# Patient Record
Sex: FEMALE | Race: WHITE | NOT HISPANIC OR LATINO | URBAN - METROPOLITAN AREA
[De-identification: names, ages, dates, MRNs, and addresses within clinical notes are randomized per-mention and may not be internally consistent; named-entity substitution may affect disease eponyms.]

---

## 2017-05-23 ENCOUNTER — APPOINTMENT (RX ONLY)
Dept: URBAN - METROPOLITAN AREA CLINIC 71 | Facility: CLINIC | Age: 37
Setting detail: DERMATOLOGY
End: 2017-05-23

## 2017-05-23 ENCOUNTER — APPOINTMENT (RX ONLY)
Dept: URBAN - METROPOLITAN AREA CLINIC 70 | Facility: CLINIC | Age: 37
Setting detail: DERMATOLOGY
End: 2017-05-23

## 2017-05-23 DIAGNOSIS — L72.0 EPIDERMAL CYST: ICD-10-CM

## 2017-05-23 PROCEDURE — ? TREATMENT REGIMEN

## 2017-05-23 PROCEDURE — ? COUNSELING

## 2017-05-23 ASSESSMENT — LOCATION ZONE DERM
LOCATION ZONE: FACE
LOCATION ZONE: FACE

## 2017-05-23 ASSESSMENT — LOCATION SIMPLE DESCRIPTION DERM
LOCATION SIMPLE: GLABELLA
LOCATION SIMPLE: GLABELLA

## 2017-05-23 ASSESSMENT — LOCATION DETAILED DESCRIPTION DERM
LOCATION DETAILED: GLABELLA
LOCATION DETAILED: GLABELLA

## 2017-05-23 NOTE — HPI: CYST
How Severe Is Your Cyst?: severe
Is This A New Presentation, Or A Follow-Up?: Cysts
Additional History: Pt presents for treatment and removal options. She has not tried therapy at this time. She has a history of developing cystic lesion behind ears as well. LS

## 2017-05-23 NOTE — PROCEDURE: TREATMENT REGIMEN
Initiate Treatment: solodyn 55mg daily (weight based) x 14 days (provided samples due to no prescription coverage)
Otc Regimen: BPO wash daily
Detail Level: Simple
Plan: Size 1.5x1.7 Cm. Discussed treatment options. Recommend antibiotic at this time. Will likely plan for ILK at follow up. After office visit - case reviewed with Dr. Mora for excision consultation. Recommendation at this time is to use therapies to decrease size of lesion and will plan for possible excision if size improves. May need referral to oculoplastics. LS

## 2017-05-23 NOTE — PROCEDURE: TREATMENT REGIMEN
Otc Regimen: BPO wash daily
Plan: Size 1.5x1.7 Cm. Discussed treatment options. Recommend antibiotic at this time. Will likely plan for ILK at follow up. After office visit - case reviewed with Dr. Leslie for excision consultation. Recommendation at this time is to use therapies to decrease size of lesion and will plan for possible excision if size improves. May need referral to oculoplastics. LS
Detail Level: Simple
Initiate Treatment: solodyn 55mg daily (weight based) x 14 days (provided samples due to no prescription coverage)

## 2017-06-16 ENCOUNTER — APPOINTMENT (RX ONLY)
Dept: URBAN - METROPOLITAN AREA CLINIC 71 | Facility: CLINIC | Age: 37
Setting detail: DERMATOLOGY
End: 2017-06-16

## 2017-06-16 ENCOUNTER — APPOINTMENT (RX ONLY)
Dept: URBAN - METROPOLITAN AREA CLINIC 70 | Facility: CLINIC | Age: 37
Setting detail: DERMATOLOGY
End: 2017-06-16

## 2017-06-16 DIAGNOSIS — L72.0 EPIDERMAL CYST: ICD-10-CM | Status: UNCHANGED

## 2017-06-16 PROCEDURE — ? INTRALESIONAL KENALOG

## 2017-06-16 PROCEDURE — ? TREATMENT REGIMEN

## 2017-06-16 PROCEDURE — ? COUNSELING

## 2017-06-16 PROCEDURE — 11900 INJECT SKIN LESIONS </W 7: CPT

## 2017-06-16 ASSESSMENT — LOCATION ZONE DERM
LOCATION ZONE: FACE
LOCATION ZONE: FACE

## 2017-06-16 ASSESSMENT — LOCATION SIMPLE DESCRIPTION DERM
LOCATION SIMPLE: GLABELLA
LOCATION SIMPLE: GLABELLA

## 2017-06-16 ASSESSMENT — LOCATION DETAILED DESCRIPTION DERM
LOCATION DETAILED: GLABELLA
LOCATION DETAILED: GLABELLA

## 2017-06-16 NOTE — PROCEDURE: TREATMENT REGIMEN
Detail Level: Simple
Otc Regimen: BPO wash daily
Plan: Size 1.5x1.7 Cm. Discussed treatment options. Recommend antibiotic at this time. Will likely plan for ILK at follow up. After office visit - case reviewed with Dr. Mora for excision consultation. Recommendation at this time is to use therapies to decrease size of lesion and will plan for possible excision if size improves. May need referral to oculoplastics. LS

## 2017-06-16 NOTE — PROCEDURE: INTRALESIONAL KENALOG
Treatment Number (Optional): 1
Administered By (Optional): JOCELYN
Consent: The risks of atrophy were reviewed with the patient.
Medical Necessity Clause: This procedure was medically necessary because the lesions that were treated were:
Include Z78.9 (Other Specified Conditions Influencing Health Status) As An Associated Diagnosis?: No
Size Of Lesion (Optional): 1.5
Detail Level: Detailed
Concentration Of Kenalog Solution Injected (Mg/Ml): 40.0
Total Volume (Ccs): 0.3
Kenalog Preparation: Kenalog
Lot # For Kenalog (Optional): [id number]
Expiration Date For Kenalog (Optional): Oct 2018
X Size Of Lesion In Cm (Optional): 1.7

## 2017-06-16 NOTE — PROCEDURE: INTRALESIONAL KENALOG
Administered By (Optional): DONNIE
Expiration Date For Kenalog (Optional): Oct 2018
Consent: The risks of atrophy were reviewed with the patient.
Detail Level: Detailed
X Size Of Lesion In Cm (Optional): 1.7
Medical Necessity Clause: This procedure was medically necessary because the lesions that were treated were:
Total Volume (Ccs): 0.3
Concentration Of Kenalog Solution Injected (Mg/Ml): 40.0
Include Z78.9 (Other Specified Conditions Influencing Health Status) As An Associated Diagnosis?: No
Kenalog Preparation: Kenalog
Size Of Lesion (Optional): 1.5
Treatment Number (Optional): 1
Lot # For Kenalog (Optional): [id number]

## 2017-06-16 NOTE — PROCEDURE: TREATMENT REGIMEN
Detail Level: Simple
Otc Regimen: BPO wash daily
Plan: Size 1.5x1.7 Cm. Discussed treatment options. Recommend antibiotic at this time. Will likely plan for ILK at follow up. After office visit - case reviewed with Dr. Leslie for excision consultation. Recommendation at this time is to use therapies to decrease size of lesion and will plan for possible excision if size improves. May need referral to oculoplastics. LS

## 2017-06-30 ENCOUNTER — APPOINTMENT (RX ONLY)
Dept: URBAN - METROPOLITAN AREA CLINIC 71 | Facility: CLINIC | Age: 37
Setting detail: DERMATOLOGY
End: 2017-06-30

## 2017-06-30 ENCOUNTER — APPOINTMENT (RX ONLY)
Dept: URBAN - METROPOLITAN AREA CLINIC 70 | Facility: CLINIC | Age: 37
Setting detail: DERMATOLOGY
End: 2017-06-30

## 2017-06-30 DIAGNOSIS — L72.0 EPIDERMAL CYST: ICD-10-CM

## 2017-06-30 PROCEDURE — 11900 INJECT SKIN LESIONS </W 7: CPT

## 2017-06-30 PROCEDURE — ? TREATMENT REGIMEN

## 2017-06-30 PROCEDURE — ? INTRALESIONAL KENALOG

## 2017-06-30 PROCEDURE — ? COUNSELING

## 2017-06-30 ASSESSMENT — LOCATION DETAILED DESCRIPTION DERM
LOCATION DETAILED: GLABELLA
LOCATION DETAILED: LEFT MEDIAL EYEBROW
LOCATION DETAILED: GLABELLA
LOCATION DETAILED: LEFT MEDIAL EYEBROW

## 2017-06-30 ASSESSMENT — LOCATION SIMPLE DESCRIPTION DERM
LOCATION SIMPLE: LEFT EYEBROW
LOCATION SIMPLE: GLABELLA
LOCATION SIMPLE: LEFT EYEBROW
LOCATION SIMPLE: GLABELLA

## 2017-06-30 ASSESSMENT — LOCATION ZONE DERM
LOCATION ZONE: FACE
LOCATION ZONE: FACE

## 2017-06-30 NOTE — PROCEDURE: TREATMENT REGIMEN
Plan: Size 1.5x1.7 Cm. Discussed treatment options. Recommend antibiotic at this time. Will likely plan for ILK at follow up. After office visit - case reviewed with Dr. Leslie for excision consultation. Recommendation at this time is to use therapies to decrease size of lesion and will plan for possible excision if size improves. May need referral to oculoplastics. LS
Otc Regimen: BPO wash daily
Detail Level: Simple

## 2017-06-30 NOTE — PROCEDURE: TREATMENT REGIMEN
Otc Regimen: BPO wash daily
Detail Level: Simple
Plan: Size 1.5x1.7 Cm. Discussed treatment options. Recommend antibiotic at this time. Will likely plan for ILK at follow up. After office visit - case reviewed with Dr. Mora for excision consultation. Recommendation at this time is to use therapies to decrease size of lesion and will plan for possible excision if size improves. May need referral to oculoplastics. LS

## 2017-06-30 NOTE — PROCEDURE: INTRALESIONAL KENALOG
Detail Level: Detailed
Include Z78.9 (Other Specified Conditions Influencing Health Status) As An Associated Diagnosis?: No
Consent: The risks of atrophy were reviewed with the patient.
Administered By (Optional): JOCELYN
Concentration Of Kenalog Solution Injected (Mg/Ml): 40.0
Expiration Date For Kenalog (Optional): Oct 2018
Total Volume (Ccs): 0.3
Size Of Lesion (Optional): 1.5
Kenalog Preparation: Kenalog
Treatment Number (Optional): 2
Lot # For Kenalog (Optional): [id number]
X Size Of Lesion In Cm (Optional): 1.7
Medical Necessity Clause: This procedure was medically necessary because the lesions that were treated were:

## 2017-07-14 ENCOUNTER — APPOINTMENT (RX ONLY)
Dept: URBAN - METROPOLITAN AREA CLINIC 71 | Facility: CLINIC | Age: 37
Setting detail: DERMATOLOGY
End: 2017-07-14

## 2017-07-14 ENCOUNTER — APPOINTMENT (RX ONLY)
Dept: URBAN - METROPOLITAN AREA CLINIC 70 | Facility: CLINIC | Age: 37
Setting detail: DERMATOLOGY
End: 2017-07-14

## 2017-07-14 DIAGNOSIS — L72.0 EPIDERMAL CYST: ICD-10-CM | Status: WORSENING

## 2017-07-14 DIAGNOSIS — Z71.89 OTHER SPECIFIED COUNSELING: ICD-10-CM

## 2017-07-14 PROCEDURE — ? COUNSELING

## 2017-07-14 PROCEDURE — 99213 OFFICE O/P EST LOW 20 MIN: CPT

## 2017-07-14 PROCEDURE — ? TREATMENT REGIMEN

## 2017-07-14 ASSESSMENT — LOCATION DETAILED DESCRIPTION DERM
LOCATION DETAILED: RIGHT MEDIAL EYEBROW
LOCATION DETAILED: RIGHT MEDIAL EYEBROW

## 2017-07-14 ASSESSMENT — LOCATION ZONE DERM
LOCATION ZONE: FACE
LOCATION ZONE: FACE

## 2017-07-14 ASSESSMENT — LOCATION SIMPLE DESCRIPTION DERM
LOCATION SIMPLE: RIGHT EYEBROW
LOCATION SIMPLE: RIGHT EYEBROW

## 2017-07-14 NOTE — PROCEDURE: TREATMENT REGIMEN
Initiate Treatment: Doryx MPC 120mg QD x 12 days
Samples Given: Doryx MPC 120mg
Detail Level: Simple

## 2017-07-14 NOTE — PROCEDURE: TREATMENT REGIMEN
Initiate Treatment: Doryx MPC 120mg QD x 12 days
Detail Level: Simple
Samples Given: Doryx MPC 120mg

## 2017-07-28 ENCOUNTER — APPOINTMENT (RX ONLY)
Dept: URBAN - METROPOLITAN AREA CLINIC 70 | Facility: CLINIC | Age: 37
Setting detail: DERMATOLOGY
End: 2017-07-28

## 2017-07-28 ENCOUNTER — APPOINTMENT (RX ONLY)
Dept: URBAN - METROPOLITAN AREA CLINIC 71 | Facility: CLINIC | Age: 37
Setting detail: DERMATOLOGY
End: 2017-07-28

## 2017-07-28 DIAGNOSIS — L72.0 EPIDERMAL CYST: ICD-10-CM

## 2017-07-28 PROCEDURE — ? TREATMENT REGIMEN

## 2017-07-28 PROCEDURE — ? COUNSELING

## 2017-07-28 PROCEDURE — 99213 OFFICE O/P EST LOW 20 MIN: CPT

## 2017-07-28 ASSESSMENT — LOCATION ZONE DERM
LOCATION ZONE: FACE
LOCATION ZONE: FACE

## 2017-07-28 ASSESSMENT — LOCATION SIMPLE DESCRIPTION DERM
LOCATION SIMPLE: RIGHT EYEBROW
LOCATION SIMPLE: RIGHT EYEBROW

## 2017-07-28 ASSESSMENT — LOCATION DETAILED DESCRIPTION DERM
LOCATION DETAILED: RIGHT MEDIAL EYEBROW
LOCATION DETAILED: RIGHT MEDIAL EYEBROW

## 2021-08-15 NOTE — PROCEDURE: INTRALESIONAL KENALOG
Kenalog Preparation: Kenalog
Consent: The risks of atrophy were reviewed with the patient.
Lot # For Kenalog (Optional): [id number]
Detail Level: Detailed
Total Volume (Ccs): 0.3
Administered By (Optional): DONNIE
X Size Of Lesion In Cm (Optional): 1.7
Include Z78.9 (Other Specified Conditions Influencing Health Status) As An Associated Diagnosis?: No
Expiration Date For Kenalog (Optional): Oct 2018
Concentration Of Kenalog Solution Injected (Mg/Ml): 40.0
Treatment Number (Optional): 2
Size Of Lesion (Optional): 1.5
Medical Necessity Clause: This procedure was medically necessary because the lesions that were treated were:
Patient